# Patient Record
Sex: FEMALE | Race: WHITE | Employment: FULL TIME | URBAN - METROPOLITAN AREA
[De-identification: names, ages, dates, MRNs, and addresses within clinical notes are randomized per-mention and may not be internally consistent; named-entity substitution may affect disease eponyms.]

---

## 2019-07-16 ENCOUNTER — HOSPITAL ENCOUNTER (EMERGENCY)
Age: 24
Discharge: HOME | End: 2019-07-16
Attending: EMERGENCY MEDICINE
Payer: COMMERCIAL

## 2019-07-16 VITALS
RESPIRATION RATE: 14 BRPM | DIASTOLIC BLOOD PRESSURE: 84 MMHG | WEIGHT: 230 LBS | OXYGEN SATURATION: 99 % | HEIGHT: 67 IN | SYSTOLIC BLOOD PRESSURE: 121 MMHG | BODY MASS INDEX: 36.1 KG/M2 | TEMPERATURE: 98.2 F | HEART RATE: 101 BPM

## 2019-07-16 DIAGNOSIS — R19.7 DIARRHEA OF PRESUMED INFECTIOUS ORIGIN: Primary | ICD-10-CM

## 2019-07-16 LAB
ALBUMIN SERPL-MCNC: 4.5 G/DL (ref 3.4–5)
ALP BLD-CCNC: 65 U/L (ref 40–129)
ALT SERPL-CCNC: 20 U/L (ref 10–40)
ANION GAP SERPL CALCULATED.3IONS-SCNC: 15 MMOL/L (ref 3–16)
AST SERPL-CCNC: 28 U/L (ref 15–37)
BACTERIA: ABNORMAL /HPF
BASOPHILS ABSOLUTE: 0.1 K/UL (ref 0–0.2)
BASOPHILS RELATIVE PERCENT: 1 %
BILIRUB SERPL-MCNC: 0.4 MG/DL (ref 0–1)
BILIRUBIN DIRECT: <0.2 MG/DL (ref 0–0.3)
BILIRUBIN URINE: ABNORMAL
BILIRUBIN, INDIRECT: NORMAL MG/DL (ref 0–1)
BLOOD, URINE: NEGATIVE
BUN BLDV-MCNC: 14 MG/DL (ref 7–20)
CALCIUM SERPL-MCNC: 9.9 MG/DL (ref 8.3–10.6)
CHLORIDE BLD-SCNC: 105 MMOL/L (ref 99–110)
CLARITY: CLEAR
CO2: 19 MMOL/L (ref 21–32)
COLOR: YELLOW
CREAT SERPL-MCNC: 0.7 MG/DL (ref 0.6–1.1)
EOSINOPHILS ABSOLUTE: 0.1 K/UL (ref 0–0.6)
EOSINOPHILS RELATIVE PERCENT: 0.7 %
EPITHELIAL CELLS, UA: ABNORMAL /HPF
GFR AFRICAN AMERICAN: >60
GFR NON-AFRICAN AMERICAN: >60
GLUCOSE BLD-MCNC: 73 MG/DL (ref 70–99)
GLUCOSE URINE: NEGATIVE MG/DL
HCT VFR BLD CALC: 40.6 % (ref 36–48)
HEMOGLOBIN: 13.6 G/DL (ref 12–16)
KETONES, URINE: 40 MG/DL
LEUKOCYTE ESTERASE, URINE: ABNORMAL
LIPASE: 23 U/L (ref 13–60)
LYMPHOCYTES ABSOLUTE: 2.6 K/UL (ref 1–5.1)
LYMPHOCYTES RELATIVE PERCENT: 28.4 %
MCH RBC QN AUTO: 27.7 PG (ref 26–34)
MCHC RBC AUTO-ENTMCNC: 33.4 G/DL (ref 31–36)
MCV RBC AUTO: 82.8 FL (ref 80–100)
MICROSCOPIC EXAMINATION: YES
MONOCYTES ABSOLUTE: 0.5 K/UL (ref 0–1.3)
MONOCYTES RELATIVE PERCENT: 5.4 %
MUCUS: ABNORMAL /LPF
NEUTROPHILS ABSOLUTE: 6 K/UL (ref 1.7–7.7)
NEUTROPHILS RELATIVE PERCENT: 64.5 %
NITRITE, URINE: NEGATIVE
PDW BLD-RTO: 14.4 % (ref 12.4–15.4)
PH UA: 6 (ref 5–8)
PLATELET # BLD: 273 K/UL (ref 135–450)
PMV BLD AUTO: 7.9 FL (ref 5–10.5)
POTASSIUM SERPL-SCNC: 4.9 MMOL/L (ref 3.5–5.1)
PRO-BNP: 50 PG/ML (ref 0–124)
PROTEIN UA: NEGATIVE MG/DL
RBC # BLD: 4.9 M/UL (ref 4–5.2)
RBC UA: ABNORMAL /HPF (ref 0–2)
SODIUM BLD-SCNC: 139 MMOL/L (ref 136–145)
SPECIFIC GRAVITY UA: >=1.03 (ref 1–1.03)
TOTAL PROTEIN: 8.1 G/DL (ref 6.4–8.2)
URINE TYPE: ABNORMAL
UROBILINOGEN, URINE: 0.2 E.U./DL
WBC # BLD: 9.2 K/UL (ref 4–11)
WBC UA: ABNORMAL /HPF (ref 0–5)

## 2019-07-16 PROCEDURE — 85025 COMPLETE CBC W/AUTO DIFF WBC: CPT

## 2019-07-16 PROCEDURE — 81001 URINALYSIS AUTO W/SCOPE: CPT

## 2019-07-16 PROCEDURE — 2580000003 HC RX 258: Performed by: EMERGENCY MEDICINE

## 2019-07-16 PROCEDURE — 96360 HYDRATION IV INFUSION INIT: CPT

## 2019-07-16 PROCEDURE — 80048 BASIC METABOLIC PNL TOTAL CA: CPT

## 2019-07-16 PROCEDURE — 83880 ASSAY OF NATRIURETIC PEPTIDE: CPT

## 2019-07-16 PROCEDURE — 80076 HEPATIC FUNCTION PANEL: CPT

## 2019-07-16 PROCEDURE — 83690 ASSAY OF LIPASE: CPT

## 2019-07-16 PROCEDURE — 99283 EMERGENCY DEPT VISIT LOW MDM: CPT

## 2019-07-16 RX ORDER — 0.9 % SODIUM CHLORIDE 0.9 %
1000 INTRAVENOUS SOLUTION INTRAVENOUS ONCE
Status: COMPLETED | OUTPATIENT
Start: 2019-07-16 | End: 2019-07-16

## 2019-07-16 RX ORDER — ATENOLOL 25 MG/1
12.5 TABLET ORAL DAILY
COMMUNITY

## 2019-07-16 RX ORDER — ONDANSETRON 4 MG/1
4 TABLET, FILM COATED ORAL EVERY 8 HOURS PRN
Qty: 20 TABLET | Refills: 0 | Status: SHIPPED | OUTPATIENT
Start: 2019-07-16

## 2019-07-16 RX ORDER — METRONIDAZOLE 500 MG/1
500 TABLET ORAL 2 TIMES DAILY
COMMUNITY

## 2019-07-16 RX ADMIN — SODIUM CHLORIDE 1000 ML: 9 INJECTION, SOLUTION INTRAVENOUS at 20:07

## 2019-07-16 ASSESSMENT — ENCOUNTER SYMPTOMS
VOMITING: 0
NAUSEA: 1
RECTAL PAIN: 0
BLOOD IN STOOL: 0
DIARRHEA: 1
CHEST TIGHTNESS: 0
ABDOMINAL PAIN: 1
SHORTNESS OF BREATH: 0

## 2019-07-16 NOTE — ED PROVIDER NOTES
that she drinks alcohol. She reports that she does not use drugs. Medications     Previous Medications    ATENOLOL (TENORMIN) 25 MG TABLET    Take 12.5 mg by mouth daily    METRONIDAZOLE (FLAGYL) 500 MG TABLET    Take 500 mg by mouth 2 times daily    PHENTERMINE-TOPIRAMATE (QSYMIA PO)    Take by mouth daily       Allergies     She is allergic to suprax [cefixime]. Physical Exam     INITIAL VITALS: BP: 121/84, Temp: 98.2 °F (36.8 °C), Pulse: 101, Resp: 14, SpO2: 99 %    Physical Exam   Constitutional: She is oriented to person, place, and time. She appears well-developed and well-nourished. Well appearing, nontoxic, NAD   Cardiovascular: Normal rate, regular rhythm, normal heart sounds and intact distal pulses. Pulmonary/Chest: Effort normal and breath sounds normal. No stridor. No respiratory distress. She has no wheezes. Abdominal: Soft. She exhibits no distension and no mass. There is no tenderness. There is no guarding. No abdominal tenderness to palpation, no rebound or guarding, no peritoneal signs   Musculoskeletal: Normal range of motion. She exhibits no edema. Neurological: She is alert and oriented to person, place, and time. Skin: Skin is warm and dry. Vitals reviewed.       Diagnostic Results       RADIOLOGY:  No orders to display       LABS:   Results for orders placed or performed during the hospital encounter of 07/16/19   CBC auto differential   Result Value Ref Range    WBC 9.2 4.0 - 11.0 K/uL    RBC 4.90 4.00 - 5.20 M/uL    Hemoglobin 13.6 12.0 - 16.0 g/dL    Hematocrit 40.6 36.0 - 48.0 %    MCV 82.8 80.0 - 100.0 fL    MCH 27.7 26.0 - 34.0 pg    MCHC 33.4 31.0 - 36.0 g/dL    RDW 14.4 12.4 - 15.4 %    Platelets 436 899 - 646 K/uL    MPV 7.9 5.0 - 10.5 fL    Neutrophils % 64.5 %    Lymphocytes % 28.4 %    Monocytes % 5.4 %    Eosinophils % 0.7 %    Basophils % 1.0 %    Neutrophils # 6.0 1.7 - 7.7 K/uL    Lymphocytes # 2.6 1.0 - 5.1 K/uL    Monocytes # 0.5 0.0 - 1.3 K/uL

## 2019-07-17 NOTE — ED NOTES
Dc inst and script given to pt and verb understanding. PIV removed. Supplies given to take home to collect stool specimen for PCP.   Dc'd ambulatory to private auto with family     Iraj Knox RN  07/16/19 [id number]

## 2025-03-31 ENCOUNTER — PROCEDURE VISIT (OUTPATIENT)
Dept: SURGERY | Age: 30
End: 2025-03-31
Payer: COMMERCIAL

## 2025-03-31 VITALS — DIASTOLIC BLOOD PRESSURE: 85 MMHG | SYSTOLIC BLOOD PRESSURE: 131 MMHG | HEART RATE: 85 BPM

## 2025-03-31 DIAGNOSIS — C44.41 BASAL CELL CARCINOMA (BCC) OF SCALP: Primary | ICD-10-CM

## 2025-03-31 PROCEDURE — 17311 MOHS 1 STAGE H/N/HF/G: CPT | Performed by: DERMATOLOGY

## 2025-03-31 PROCEDURE — 12032 INTMD RPR S/A/T/EXT 2.6-7.5: CPT | Performed by: DERMATOLOGY

## 2025-03-31 PROCEDURE — 17312 MOHS ADDL STAGE: CPT | Performed by: DERMATOLOGY

## 2025-03-31 RX ORDER — TIMOLOL MALEATE 5 MG/ML
1 SOLUTION/ DROPS OPHTHALMIC DAILY
COMMUNITY
Start: 2025-03-11

## 2025-03-31 RX ORDER — RIZATRIPTAN BENZOATE 10 MG/1
TABLET ORAL
COMMUNITY

## 2025-03-31 RX ORDER — MULTIVITAMIN
1 TABLET ORAL DAILY
COMMUNITY

## 2025-03-31 NOTE — PROGRESS NOTES
PRE-PROCEDURE SCREENING    Pacemaker/ICD: No  Difficulty with numbing in the past: No  Local Anesthesia Reaction/passing out: No  Latex or adhesive allergy:  No  Any history of reaction to suture or skin glue:  no  Bleeding/Clotting Disorders: No  Anticoagulant Therapy: No  Joint prosthesis: No  Artificial Heart Valve: No  Stroke or Seizures: No  Organ Transplant or Lymphoma: No  Immunosuppression: No, but has primary immune deficiency with low IgA and states gets sick a lot.  Respiratory Problems: No    
technique.    The final incision lines were placed with respect for the patient's natural skin tension lines in a linear configuration to avoid functional and aesthetic distortion of adjacent free margins. Following minimal undermining, meticulous hemostasis was obtained with spot monopolar electrocoagulation.  Subcutaneous dead space and dermis were closed using 4-0 Vicryl buried subcutaneous interrupted suture and the epidermis was approximated with 4-0 Prolene interrupted epidermal sutures.         WOUND COVERAGE:  The wound was cleaned with normal saline solution, dried off, Aquaphor ointment was applied, and the wound was covered.  A dressing was applied for stabilization and light pressure.  The patient was given detailed oral and written instructions on postoperative care.     There were no complications.  The patient left the Unit in good medical condition.     FOLLOW-UP:  The patient will return for suture removal in 14-21 days.

## 2025-03-31 NOTE — PATIENT INSTRUCTIONS
[facility] Office Hours:    Monday-Thursday  7:30 AM-4:30 PM    Friday  9:00 AM-1:00 PM       POST-OPERATIVE CARE FOR STITCHES  Bandage change after 48 hours    CARING FOR YOUR SURGICAL SITE  The bandage should remain on and completely dry for 48 hours. Do NOT get the bandage wet.  After the first 48 hours, gently remove the remaining part of the bandage. It can be helpful to moisten the bandage edges in the shower. Steri strips may still be on the wound. It is ok, they will fall off slowly with the daily bandage changes.  Gently clean the wound daily with mild soap and water. Try to clean off crust and debris.   Dry (pat) the area with a clean Q-tip or gauze.   Apply a layer of Vaseline/ Aquaphor (or Bacitracin if your doctor recommends) to the wound area only.  Cut a piece of Telfa (or any non-stick dressing) to fit just over the wound and secure it with paper tape. If the wound is small you may use a Band- Aid. Keep area covered for a total of 2 week(s).  If the dressing comes off or if you have questions, or concerns about the dressing, please call the office for instructions!    POST OPERATIVE INSTRUCTIONS    Activity: Do not lift anything heavier than a gallon of milk for 1 week. Also, avoid strenuous activity such as running, power walking or contact sports.  Eating and drinking: Do not drink alcohol for 48 hours after your procedure. Alcohol increases the chances of bleeding.  Medicines   -If you have discomfort, take Acetaminophen (Tylenol or Extra Strength Tylenol). Follow the instructions and warning on the bottle.  -If your doctor has prescribed you an Aspirin daily, please keep taking it. Do not take extra Aspirin or medicines containing Aspirin (such as Yelitza-Hayden and Excedrin) for 48 hours after your procedure.    Bleeding: If bleeding occurs, DO NOT remove the bandage. Put firm pressure on the area with gauze for 20 minutes without peeking. If the bleeding continues, apply

## 2025-04-01 ENCOUNTER — TELEPHONE (OUTPATIENT)
Dept: SURGERY | Age: 30
End: 2025-04-01

## 2025-04-01 NOTE — TELEPHONE ENCOUNTER
The patient was in the office on 03/31/2025 for Mohs located on the RT central frontal scalp with ILC repair.  The patient tolerated the procedure well and left the office in good condition.    Pain level on post-operative day 1:  discomfort level - last evening- level - 4 - She was advised how to take Tylenol every 8.0 hrs and Advil or Aleve every 4.0 hrs for the 1st 48 hrs.  PT understood all info given re: medication today.     Any bleeding episode that required pressure to be held, bandage change or a call to the office or MD?  no     Any other issues?:  no    A post-operative telephone call was placed at 9:23a, 04/01/2025, in order to check on the patient's recovery process.  The patient reported doing well and had no complaints other than those listed above, if any.  All of the patient's questions were answered. Advised to call w/any questions/concerns.

## 2025-04-01 NOTE — TELEPHONE ENCOUNTER
Patient had Mohs right central frontal scalp, superficial NBCC yesterday. She has questions on how much Tylenol to take.

## 2025-04-04 ENCOUNTER — TELEPHONE (OUTPATIENT)
Dept: SURGERY | Age: 30
End: 2025-04-04

## 2025-04-04 NOTE — TELEPHONE ENCOUNTER
Patient had Mohs right central frontal scalp, superficial NBCC, 3/31/25. She states some of her stitches are coming out and there is some bleeding.

## 2025-04-04 NOTE — TELEPHONE ENCOUNTER
Spoke with patient, she states one stitch from center of surgical area came out, she had slight bleeding which has stopped at this time. Educated patient on continuing daily wound cleansing, Vaseline and bandaging and area will heal in via 2nd intention. Educated patient to call if area opens more and bleeding continues or any s/s of infection. Pt will be in for suture removal on 4/14. Pt verbalized understanding.

## 2025-04-14 ENCOUNTER — CLINICAL SUPPORT (OUTPATIENT)
Dept: SURGERY | Age: 30
End: 2025-04-14

## 2025-04-14 DIAGNOSIS — Z48.02 VISIT FOR SUTURE REMOVAL: Primary | ICD-10-CM

## 2025-04-14 NOTE — PATIENT INSTRUCTIONS
[facility] Office Hours:    Monday-Thursday  7:30 AM-4:30 PM    Friday  9:00 AM-1:00 PM      WOUND CARE AFTER SUTURE REMOVAL    After your stiches have been removed, your scar is still very fragile. In fact, scars continue to change and evolve, what we call remodel, for about a year after your procedure.  Follow the following steps below to ensure that your scar heals well.    Instructions    1. If Steri-strips were applied, keep them on until they fall off on their own.  2. Protect your scar from the sun. Use a sunscreen or bandage to cover your scar. Sun exposure can cause your scar to become discolored and appear red or brown.  3. To help soften your scar more rapidly, it is helpful, but not necessary, for you to   massage the scar gently each night for twenty minutes.   4. “Spitting” suture. Occasionally, an inside suture (stitch) does not completely dissolve. When this happens, (generally 4-8 weeks after surgery), it causes a bump or “pimple” to form on the scar. This is easily removed and is not at all serious. It   does not mean the skin cancer has returned. Contact us if it happens, but do not be alarmed.      5. If you scar becomes tender, itchy or becomes very large, let Dr. Snyder know.  There    are treatments that can improve the appearance of your scar or help make it more comfortable.          Wound Care Massaging Instruction    Starting at approximately about 4 weeks following surgery, we often ask that our patients begin massaging their wound on a regular basis.  We suggest just a few minutes once a day. It is helpful to use an emollient such as Aquaphor, Vaseline or Eucerin cream.  The sutures that were placed underneath the surface of the skin take about 70 days to dissolve, and during that time, they can cause lumps along the line of the scar.  These lumps will eventually go away on their own, but the use of regular massage will help speed the process as well as help soften

## 2025-04-14 NOTE — PROGRESS NOTES
S:  The patient is here for suture removal s/p Mohs surgery on the right central frontal scalp and Intermediate layered closure repair, 2 weeks ago. The site appears well-healed without signs of infection (redness, pain or discharge). The sutures were removed.  Wound care and activity instructions given.  The patient was scheduled for follow-up prn for scar/wound check.  The patient was scheduled for f/u with General Dermatology per their instructions.